# Patient Record
Sex: MALE
[De-identification: names, ages, dates, MRNs, and addresses within clinical notes are randomized per-mention and may not be internally consistent; named-entity substitution may affect disease eponyms.]

---

## 2020-09-30 ENCOUNTER — APPOINTMENT (OUTPATIENT)
Dept: PULMONOLOGY | Facility: CLINIC | Age: 85
End: 2020-09-30
Payer: MEDICARE

## 2020-09-30 VITALS
HEART RATE: 61 BPM | WEIGHT: 210 LBS | BODY MASS INDEX: 31.1 KG/M2 | HEIGHT: 69 IN | SYSTOLIC BLOOD PRESSURE: 159 MMHG | OXYGEN SATURATION: 95 % | DIASTOLIC BLOOD PRESSURE: 72 MMHG

## 2020-09-30 DIAGNOSIS — Z86.79 PERSONAL HISTORY OF OTHER DISEASES OF THE CIRCULATORY SYSTEM: ICD-10-CM

## 2020-09-30 DIAGNOSIS — Z87.19 PERSONAL HISTORY OF OTHER DISEASES OF THE DIGESTIVE SYSTEM: ICD-10-CM

## 2020-09-30 DIAGNOSIS — J38.1 POLYP OF VOCAL CORD AND LARYNX: ICD-10-CM

## 2020-09-30 DIAGNOSIS — D64.9 ANEMIA, UNSPECIFIED: ICD-10-CM

## 2020-09-30 DIAGNOSIS — R06.02 SHORTNESS OF BREATH: ICD-10-CM

## 2020-09-30 DIAGNOSIS — R42 DIZZINESS AND GIDDINESS: ICD-10-CM

## 2020-09-30 DIAGNOSIS — Z23 ENCOUNTER FOR IMMUNIZATION: ICD-10-CM

## 2020-09-30 DIAGNOSIS — K31.84 GASTROPARESIS: ICD-10-CM

## 2020-09-30 PROCEDURE — 90662 IIV NO PRSV INCREASED AG IM: CPT

## 2020-09-30 PROCEDURE — G0008: CPT

## 2020-09-30 PROCEDURE — 99203 OFFICE O/P NEW LOW 30 MIN: CPT | Mod: 25

## 2020-09-30 RX ORDER — GLIMEPIRIDE 4 MG/1
TABLET ORAL
Refills: 0 | Status: ACTIVE | COMMUNITY

## 2020-09-30 RX ORDER — FENOFIBRIC ACID 45 MG/1
CAPSULE, DELAYED RELEASE ORAL
Refills: 0 | Status: ACTIVE | COMMUNITY

## 2020-09-30 RX ORDER — SIMVASTATIN 80 MG/1
TABLET, FILM COATED ORAL
Refills: 0 | Status: ACTIVE | COMMUNITY

## 2020-09-30 RX ORDER — METOPROLOL TARTRATE 75 MG/1
TABLET, FILM COATED ORAL
Refills: 0 | Status: ACTIVE | COMMUNITY

## 2020-09-30 RX ORDER — CLOPIDOGREL BISULFATE 300 MG/1
TABLET, FILM COATED ORAL
Refills: 0 | Status: ACTIVE | COMMUNITY

## 2020-10-01 PROBLEM — D64.9 ANEMIA: Status: ACTIVE | Noted: 2020-09-30

## 2020-10-01 PROBLEM — Z23 ENCOUNTER FOR IMMUNIZATION: Status: ACTIVE | Noted: 2020-10-01

## 2020-10-01 NOTE — HISTORY OF PRESENT ILLNESS
[Never] : never [TextBox_4] : 86 yo male with hx of HEIDY for 15 years presents for evaluation. The patient is compliant with CPAP use, with last machine change 4 years ago but is not sure of pressure setting.He denies sleep related complaints.The patient is being evaluated by GI for anemia. [TextBox_19] : Denies snoring, daytime somnolence, apneic episodes, AM headaches

## 2020-10-01 NOTE — DISCUSSION/SUMMARY
[FreeTextEntry1] : 86 yo male with hx of HEIDY compliant with CPAP use. His DME company ([employer]) will be contacted for proper pressure setting, given the patient claiming that he "sometimes adjusts the pressure by himself". The influenza vaccine was administered at the patient's request. He is to follow up with his PMD for the pneumococcal vaccine and continued medical care.

## 2020-10-02 ENCOUNTER — MED ADMIN CHARGE (OUTPATIENT)
Age: 85
End: 2020-10-02

## 2021-03-17 ENCOUNTER — APPOINTMENT (OUTPATIENT)
Dept: PULMONOLOGY | Facility: CLINIC | Age: 86
End: 2021-03-17
Payer: MEDICARE

## 2021-03-17 VITALS
OXYGEN SATURATION: 98 % | SYSTOLIC BLOOD PRESSURE: 144 MMHG | HEART RATE: 72 BPM | HEIGHT: 69 IN | WEIGHT: 202 LBS | TEMPERATURE: 98.1 F | DIASTOLIC BLOOD PRESSURE: 76 MMHG | BODY MASS INDEX: 29.92 KG/M2

## 2021-03-17 PROCEDURE — 99213 OFFICE O/P EST LOW 20 MIN: CPT

## 2021-03-17 RX ORDER — FENOFIBRATE 145 MG/1
145 TABLET, COATED ORAL
Refills: 0 | Status: ACTIVE | COMMUNITY

## 2021-03-17 RX ORDER — FLUTICASONE PROPIONATE 50 MCG
50 SPRAY, SUSPENSION NASAL
Refills: 0 | Status: ACTIVE | COMMUNITY

## 2021-03-17 NOTE — DISCUSSION/SUMMARY
[FreeTextEntry1] : 87 yo male with HEIDY. CPAP compliance as before, stressed. He is to use OTC antihistamine use PRN for seasonal allergies given complaints. Treatment adjustment will depend on symptomatic needs. Follow up with his cardiologist as before.

## 2021-03-17 NOTE — HISTORY OF PRESENT ILLNESS
[Never] : never [TextBox_4] : 87 yo male with hx of HEIDY presents for follow up. The patient is compliant with CPAP use, stating that his machine is working "just fine".  He denies sleep related complaints. Presently he complains of PRN productive cough for a few weeks without fever, chest pain or hemoptysis. He has spring allergies with PRN nasal steroid and OTC antihistamine use.  Last week he was seen in a walk in clinic for wrist swelling, subsequently sent to ER for abnormal heart finding. Evaluation included a chest xray which was normal. [TextBox_29] : Denies snoring, daytime somnolence, apneic episodes, AM headaches

## 2022-08-24 ENCOUNTER — APPOINTMENT (OUTPATIENT)
Dept: PULMONOLOGY | Facility: CLINIC | Age: 87
End: 2022-08-24

## 2022-08-24 VITALS
BODY MASS INDEX: 29.62 KG/M2 | DIASTOLIC BLOOD PRESSURE: 68 MMHG | TEMPERATURE: 98 F | SYSTOLIC BLOOD PRESSURE: 108 MMHG | WEIGHT: 200 LBS | HEIGHT: 69 IN | OXYGEN SATURATION: 99 % | HEART RATE: 66 BPM

## 2022-08-24 PROCEDURE — 99214 OFFICE O/P EST MOD 30 MIN: CPT

## 2022-10-01 NOTE — DISCUSSION/SUMMARY
[FreeTextEntry1] : 88 yo male with hx of HEIDY compliant with CPAP use. Continued compliance stressed.He is to follow up with his PMD as before.

## 2022-10-01 NOTE — PHYSICAL EXAM
[No Acute Distress] : no acute distress [Normal Oropharynx] : normal oropharynx [Normal Appearance] : normal appearance [No Neck Mass] : no neck mass [Normal Rate/Rhythm] : normal rate/rhythm [Normal S1, S2] : normal s1, s2 [No Murmurs] : no murmurs [No Resp Distress] : no resp distress [Clear to Auscultation Bilaterally] : clear to auscultation bilaterally [No Abnormalities] : no abnormalities [Benign] : benign [Normal Gait] : normal gait [No Clubbing] : no clubbing [No Cyanosis] : no cyanosis [No Edema] : no edema [FROM] : FROM [Normal Color/ Pigmentation] : normal color/ pigmentation [No Focal Deficits] : no focal deficits [Oriented x3] : oriented x3 [Normal Affect] : normal affect [TextBox_54] : PPM

## 2022-10-01 NOTE — HISTORY OF PRESENT ILLNESS
[Never] : never [TextBox_4] : 89 yo male with hx of HEIDY, last seen March 2021, presents for follow up. The patient is compliant with CPAP use.He denies sleep related complaints. He had PPM insertion seven months ago. [TextBox_29] : Denies snoring, daytime somnolence, apneic episodes, AM headaches

## 2023-01-04 ENCOUNTER — APPOINTMENT (OUTPATIENT)
Dept: PULMONOLOGY | Facility: CLINIC | Age: 88
End: 2023-01-04
Payer: MEDICARE

## 2023-01-04 VITALS
TEMPERATURE: 98 F | DIASTOLIC BLOOD PRESSURE: 83 MMHG | OXYGEN SATURATION: 99 % | RESPIRATION RATE: 16 BRPM | BODY MASS INDEX: 29.62 KG/M2 | HEART RATE: 81 BPM | HEIGHT: 69 IN | SYSTOLIC BLOOD PRESSURE: 151 MMHG | WEIGHT: 200 LBS

## 2023-01-04 DIAGNOSIS — R05.8 OTHER SPECIFIED COUGH: ICD-10-CM

## 2023-01-04 DIAGNOSIS — G47.33 OBSTRUCTIVE SLEEP APNEA (ADULT) (PEDIATRIC): ICD-10-CM

## 2023-01-04 DIAGNOSIS — J31.0 CHRONIC RHINITIS: ICD-10-CM

## 2023-01-04 PROCEDURE — 99214 OFFICE O/P EST MOD 30 MIN: CPT

## 2023-01-16 ENCOUNTER — APPOINTMENT (OUTPATIENT)
Dept: PULMONOLOGY | Facility: CLINIC | Age: 88
End: 2023-01-16

## 2023-03-04 PROBLEM — R05.8 ALLERGIC COUGH: Status: ACTIVE | Noted: 2021-03-17

## 2023-03-04 PROBLEM — J31.0 RHINITIS: Status: ACTIVE | Noted: 2022-10-01

## 2023-03-04 PROBLEM — G47.33 OBSTRUCTIVE SLEEP APNEA, ADULT: Status: ACTIVE | Noted: 2020-09-30

## 2023-03-04 NOTE — HISTORY OF PRESENT ILLNESS
[Never] : never [TextBox_4] : 88 yo male with hx of HEIDY compliant with CPAP, presents complaining of PRN productive cough for a few weeks. The patient denies fever, chest pain or hemoptysis. He was seen in a walk in clinic, noted to be covid 19 negative without treatment. He states that there is mold in his basement. He was treated with allergy shots thirty years ago. He has never been treated with inhaled meds in the past. [TextBox_29] : Denies snoring, daytime somnolence, apneic episodes, AM headaches

## 2023-03-04 NOTE — PHYSICAL EXAM
[Normal Oropharynx] : normal oropharynx [No Acute Distress] : no acute distress [Normal Appearance] : normal appearance [No Neck Mass] : no neck mass [Normal Rate/Rhythm] : normal rate/rhythm [Normal S1, S2] : normal s1, s2 [No Murmurs] : no murmurs [No Resp Distress] : no resp distress [Clear to Auscultation Bilaterally] : clear to auscultation bilaterally [No Abnormalities] : no abnormalities [Benign] : benign [Normal Gait] : normal gait [No Clubbing] : no clubbing [No Cyanosis] : no cyanosis [No Edema] : no edema [FROM] : FROM [Normal Color/ Pigmentation] : normal color/ pigmentation [No Focal Deficits] : no focal deficits [Oriented x3] : oriented x3 [Normal Affect] : normal affect [TextBox_54] : PPM

## 2023-03-04 NOTE — DISCUSSION/SUMMARY
[FreeTextEntry1] : 89 yo male with post infectious/inflammatory cough. Advair 250/50 two week sample given. He was instructed on proper use. He is to rinse out his mouth with cold water after use. Treatment adjustment will depend on symptomatic needs.

## 2023-10-02 ENCOUNTER — APPOINTMENT (OUTPATIENT)
Dept: PULMONOLOGY | Facility: CLINIC | Age: 88
End: 2023-10-02
Payer: MEDICARE

## 2023-10-02 VITALS
HEIGHT: 69 IN | DIASTOLIC BLOOD PRESSURE: 70 MMHG | BODY MASS INDEX: 29.47 KG/M2 | RESPIRATION RATE: 16 BRPM | WEIGHT: 199 LBS | HEART RATE: 60 BPM | OXYGEN SATURATION: 99 % | SYSTOLIC BLOOD PRESSURE: 110 MMHG | TEMPERATURE: 97.8 F

## 2023-10-02 DIAGNOSIS — Z80.9 FAMILY HISTORY OF MALIGNANT NEOPLASM, UNSPECIFIED: ICD-10-CM

## 2023-10-02 DIAGNOSIS — Z78.9 OTHER SPECIFIED HEALTH STATUS: ICD-10-CM

## 2023-10-02 DIAGNOSIS — Z80.0 FAMILY HISTORY OF MALIGNANT NEOPLASM OF DIGESTIVE ORGANS: ICD-10-CM

## 2023-10-02 DIAGNOSIS — Z82.49 FAMILY HISTORY OF ISCHEMIC HEART DISEASE AND OTHER DISEASES OF THE CIRCULATORY SYSTEM: ICD-10-CM

## 2023-10-02 PROCEDURE — 99214 OFFICE O/P EST MOD 30 MIN: CPT

## 2023-10-02 RX ORDER — PANTOPRAZOLE 40 MG/1
40 TABLET, DELAYED RELEASE ORAL
Refills: 0 | Status: ACTIVE | COMMUNITY

## 2023-10-02 RX ORDER — METFORMIN ER 500 MG 500 MG/1
500 TABLET ORAL
Refills: 0 | Status: ACTIVE | COMMUNITY

## 2023-10-02 RX ORDER — AMIODARONE HYDROCHLORIDE 200 MG/1
200 TABLET ORAL
Refills: 0 | Status: ACTIVE | COMMUNITY

## 2023-10-02 RX ORDER — IRON/IRON ASP GLY/FA/MV-MIN 38 125-25-1MG
TABLET ORAL
Refills: 0 | Status: ACTIVE | COMMUNITY

## 2023-10-02 RX ORDER — PYRIDOSTIGMINE BROMIDE 30 MG/1
TABLET ORAL
Refills: 0 | Status: ACTIVE | COMMUNITY

## 2023-10-02 RX ORDER — MIDODRINE HYDROCHLORIDE 5 MG/1
5 TABLET ORAL
Refills: 0 | Status: ACTIVE | COMMUNITY

## 2023-12-24 NOTE — REVIEW OF SYSTEMS
[Sore Throat] : no sore throat [Nasal Congestion] : nasal congestion [Postnasal Drip] : no postnasal drip [Dry Mouth] : dry mouth [Cough] : no cough [Sputum] : no sputum [Dyspnea] : dyspnea [GERD] : gerd [Anemia] : anemia [Negative] : Endocrine

## 2023-12-24 NOTE — HISTORY OF PRESENT ILLNESS
[TextBox_4] : 89-year-old male with history of HEIDY on CPAP via full facemask, presents for follow-up.  The patient is compliant with CPAP use.  He denies sleep related problems but complains of nasal congestion.  He complains recently of difficulty laying down because of shortness of breath.  He uses increased number of pillows, which he states in part, is due to his GERD with gastroparesis.  Endoscopy was performed in January of this year when admitted to [facility] because of decrease in hemoglobin.  No active bleed was noted. [TextBox_29] : Denies snoring, daytime somnolence, apneic episodes, AM headaches

## 2023-12-24 NOTE — CONSULT LETTER
[Dear  ___] : Dear  [unfilled], [Courtesy Letter:] : I had the pleasure of seeing your patient, [unfilled], in my office today. [Please see my note below.] : Please see my note below. [Consult Closing:] : Thank you very much for allowing me to participate in the care of this patient.  If you have any questions, please do not hesitate to contact me. [Sincerely,] : Sincerely, [FreeTextEntry3] : PO MENDOZA MD  [address], [address] [place] [address]

## 2023-12-24 NOTE — DISCUSSION/SUMMARY
[FreeTextEntry1] : 89-year-old male with history of HEIDY compliant with CPAP use via full facemask.  Continued compliance with treatment was stressed.  His DME company ([employer]) will be contacted for a new machine which is 8 years old.  Follow-up with cardiology was recommended given recent complaints of shortness of breath with orthopnea.  GERD treatment was also discussed and stressed.  He is to follow-up with his PMD as before.

## 2024-01-03 ENCOUNTER — APPOINTMENT (OUTPATIENT)
Dept: PULMONOLOGY | Facility: CLINIC | Age: 89
End: 2024-01-03

## 2024-09-09 ENCOUNTER — APPOINTMENT (OUTPATIENT)
Dept: PULMONOLOGY | Facility: CLINIC | Age: 89
End: 2024-09-09